# Patient Record
Sex: FEMALE | Race: WHITE | Employment: FULL TIME | URBAN - METROPOLITAN AREA
[De-identification: names, ages, dates, MRNs, and addresses within clinical notes are randomized per-mention and may not be internally consistent; named-entity substitution may affect disease eponyms.]

---

## 2023-05-02 ENCOUNTER — OFFICE VISIT (OUTPATIENT)
Dept: OCCUPATIONAL MEDICINE | Age: 53
End: 2023-05-02

## 2023-05-02 DIAGNOSIS — R60.0 EDEMA OF BOTH LOWER LEGS: Primary | ICD-10-CM

## 2023-05-03 VITALS — HEART RATE: 75 BPM | DIASTOLIC BLOOD PRESSURE: 80 MMHG | OXYGEN SATURATION: 98 % | SYSTOLIC BLOOD PRESSURE: 130 MMHG

## 2023-05-03 RX ORDER — TACROLIMUS 0.3 MG/G
OINTMENT TOPICAL
COMMUNITY
Start: 2022-09-14

## 2023-05-03 RX ORDER — ESTRADIOL 2 MG/1
1 TABLET ORAL DAILY
COMMUNITY
Start: 2022-12-14

## 2023-05-03 RX ORDER — CRISABOROLE 20 MG/G
OINTMENT TOPICAL
COMMUNITY
Start: 2022-10-24

## 2023-05-03 RX ORDER — NORETHINDRONE ACETATE AND ETHINYL ESTRADIOL .03; 1.5 MG/1; MG/1
1 TABLET ORAL DAILY
COMMUNITY
Start: 2022-12-14

## 2023-05-03 RX ORDER — ALBUTEROL SULFATE 90 UG/1
AEROSOL, METERED RESPIRATORY (INHALATION)
COMMUNITY
Start: 2020-03-04

## 2023-05-03 ASSESSMENT — ENCOUNTER SYMPTOMS
COUGH: 0
SHORTNESS OF BREATH: 0
BLURRED VISION: 0

## 2023-05-03 NOTE — PROGRESS NOTES
PROGRESS NOTE    SUBJECTIVE:   Abrahan Richards is a 50 y.o. female seen for a visit regarding leg swelling    States that she gained about 20 lbs over the last year  to year and a half due to change in eating habits. She also developed a severe skin rash that was located on her chest, neck and face. She was under the care of an allergist, dermatologist and eye doctor. After about a year, and with multiple trials of medications, the rash resolved. Part of her treatment during this time was that she started a daily antihistamine. She rotates monthly (Claritin x 30 days, Zyrtec x 30 days, Allegra x 30 days, Xyzal x 30 days), she also started a gluten free diet. She was never tested for glutin allergy but when ever she tries to eat gluten, the rash returns. She is here today b/c of concern over bilateral lower leg edema that she first noticed about 6 months ago. She has recently relocated to [place] and is trying to exercise daily. She has noticed some weight loss. She reports that in the morning when she wakes up, she can see veins and definition in her feet / legs. By the end of the day the definition has gone due to swelling. She denies CP, HA, Change in vision, dizziness (including with position changes), SOB (Except with exertion such as walking up stairs or walking - she was dx with exercise induced asthma in 2009 but does not use her albuterol inhaler regularly). She denies pitting edema, leg pain, redness / streaking pattern on legs, recent flights,   MEDICATIONS: she reports to taking birth control pills daily and extra estrogen, she also takes the above regimen of antihistamine, joint health supplement, eye health supplement, and fiber daily. Albuterol MDI PRN  HISTORY: She had an uterine ablation many years ago plus she take hormones daily. She is unsure of where she is in the menopause stage of life. She sits / stands at her desk all day.  She does not add salt to her meals, but is eating many meals from

## 2023-05-10 ENCOUNTER — OFFICE VISIT (OUTPATIENT)
Dept: OCCUPATIONAL MEDICINE | Age: 53
End: 2023-05-10

## 2023-05-10 DIAGNOSIS — Z71.9 HEALTH EDUCATION/COUNSELING: Primary | ICD-10-CM

## 2023-05-17 VITALS — HEART RATE: 74 BPM | SYSTOLIC BLOOD PRESSURE: 102 MMHG | DIASTOLIC BLOOD PRESSURE: 60 MMHG | OXYGEN SATURATION: 97 %

## 2023-05-17 NOTE — PROGRESS NOTES
PROGRESS NOTE    SUBJECTIVE:   Nikita Russell is a 50 y.o. female seen for a visit regarding lab review    Comes to the clinic for a review of lab work that was drawn fastin on 4.12.23 as a part of an insurance health benefit. She denies any new c/o today. Review of Systems   Constitutional:        Denies c/o today. She is here for a lab review. Current Outpatient Medications   Medication Sig Dispense Refill    albuterol sulfate HFA (PROVENTIL;VENTOLIN;PROAIR) 108 (90 Base) MCG/ACT inhaler       Crisaborole (EUCRISA) 2 % OINT SMARTSIG:In Eye(s) Twice Daily      estradiol (ESTRACE) 2 MG tablet Take 1 tablet by mouth daily      Norethindrone Acet-Ethinyl Est 1.5-30 MG-MCG TABS Take 1 tablet by mouth daily      tacrolimus (PROTOPIC) 0.03 % ointment PLEASE SEE ATTACHED FOR DETAILED DIRECTIONS       No current facility-administered medications for this visit. Allergies   Allergen Reactions    Sulfa Antibiotics Hives     There is no problem list on file for this patient. Past Medical History:   Diagnosis Date    Endometriosis     Exercise-induced asthma     Mitral valve prolapse      No past surgical history on file. No family history on file. Social History     Tobacco Use    Smoking status: Never    Smokeless tobacco: Never   Substance Use Topics    Alcohol use: Not Currently     Comment: Occasionally will have alcohol       Past Medical History, Past Surgical History, Family history, Social History, and Medications were all reviewed with the patient today and updated as necessary. OBJECTIVE:  /60   Pulse 74   SpO2 97%      Physical Exam  Constitutional:       General: She is not in acute distress. Appearance: Normal appearance. She is normal weight. She is not ill-appearing, toxic-appearing or diaphoretic. HENT:      Head: Normocephalic and atraumatic. Cardiovascular:      Rate and Rhythm: Normal rate.    Pulmonary:      Effort: Pulmonary effort is normal.   Musculoskeletal: